# Patient Record
Sex: FEMALE | Race: WHITE | Employment: OTHER | URBAN - METROPOLITAN AREA
[De-identification: names, ages, dates, MRNs, and addresses within clinical notes are randomized per-mention and may not be internally consistent; named-entity substitution may affect disease eponyms.]

---

## 2022-10-14 ENCOUNTER — APPOINTMENT (OUTPATIENT)
Dept: GENERAL RADIOLOGY | Age: 72
End: 2022-10-14
Payer: OTHER MISCELLANEOUS

## 2022-10-14 ENCOUNTER — HOSPITAL ENCOUNTER (EMERGENCY)
Age: 72
Discharge: HOME | End: 2022-10-14
Attending: EMERGENCY MEDICINE
Payer: OTHER MISCELLANEOUS

## 2022-10-14 ENCOUNTER — APPOINTMENT (OUTPATIENT)
Dept: CT IMAGING | Age: 72
End: 2022-10-14
Payer: OTHER MISCELLANEOUS

## 2022-10-14 VITALS
OXYGEN SATURATION: 95 % | HEIGHT: 64 IN | TEMPERATURE: 99.3 F | HEART RATE: 83 BPM | BODY MASS INDEX: 38.41 KG/M2 | WEIGHT: 225 LBS | SYSTOLIC BLOOD PRESSURE: 142 MMHG | DIASTOLIC BLOOD PRESSURE: 68 MMHG | RESPIRATION RATE: 18 BRPM

## 2022-10-14 DIAGNOSIS — M25.561 ACUTE PAIN OF RIGHT KNEE: ICD-10-CM

## 2022-10-14 DIAGNOSIS — R07.89 CHEST WALL PAIN: ICD-10-CM
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
## 2022-10-14 DIAGNOSIS — S40.022A ARM CONTUSION, LEFT, INITIAL ENCOUNTER: ICD-10-CM

## 2022-10-14 LAB — TROPONIN, HIGH SENSITIVITY: 8 NG/L (ref 0–14)

## 2022-10-14 PROCEDURE — 99285 EMERGENCY DEPT VISIT HI MDM: CPT

## 2022-10-14 PROCEDURE — 84484 ASSAY OF TROPONIN QUANT: CPT

## 2022-10-14 PROCEDURE — 73562 X-RAY EXAM OF KNEE 3: CPT

## 2022-10-14 PROCEDURE — 73090 X-RAY EXAM OF FOREARM: CPT

## 2022-10-14 PROCEDURE — 73060 X-RAY EXAM OF HUMERUS: CPT

## 2022-10-14 PROCEDURE — 71250 CT THORAX DX C-: CPT

## 2022-10-14 PROCEDURE — 93005 ELECTROCARDIOGRAM TRACING: CPT | Performed by: EMERGENCY MEDICINE

## 2022-10-14 PROCEDURE — 36415 COLL VENOUS BLD VENIPUNCTURE: CPT

## 2022-10-14 ASSESSMENT — PAIN DESCRIPTION - ORIENTATION: ORIENTATION: LEFT

## 2022-10-14 ASSESSMENT — PAIN DESCRIPTION - LOCATION: LOCATION: LEG

## 2022-10-14 ASSESSMENT — PAIN - FUNCTIONAL ASSESSMENT: PAIN_FUNCTIONAL_ASSESSMENT: 0-10

## 2022-10-14 ASSESSMENT — LIFESTYLE VARIABLES
HOW OFTEN DO YOU HAVE A DRINK CONTAINING ALCOHOL: NEVER
HOW MANY STANDARD DRINKS CONTAINING ALCOHOL DO YOU HAVE ON A TYPICAL DAY: PATIENT DOES NOT DRINK

## 2022-10-14 ASSESSMENT — PAIN SCALES - GENERAL: PAINLEVEL_OUTOF10: 8
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
## 2022-10-15 LAB
EKG ATRIAL RATE: 69 BPM
EKG P AXIS: 88 DEGREES
EKG P-R INTERVAL: 142 MS
EKG Q-T INTERVAL: 412 MS
EKG QRS DURATION: 72 MS
EKG QTC CALCULATION (BAZETT): 441 MS
EKG R AXIS: -13 DEGREES
EKG T AXIS: 13 DEGREES
EKG VENTRICULAR RATE: 69 BPM
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
## 2022-10-15 NOTE — DISCHARGE INSTRUCTIONS
Take ibuprofen and acetaminophen as prescribed and on a regular schedule for the next few days. You can alternate these 2 medications every 3 hours or do them together every 6 hours to help control your pain. Otherwise, utilize rest, ice for 20 minutes several times a day, and elevate as much as possible to minimize the pain and swelling. Follow-up with your PCP for recheck. PLEASE RETURN TO THE EMERGENCY DEPARTMENT IMMEDIATELY if your symptoms worsen in anyway or in 1-2 days if not improved for re-evaluation. You should immediately return to the ER for symptoms such as new or worsening pain, fever, numbness or weakness to the arms or legs, coolness or color change of the arms or legs. [address]!!!    From [facility] ([facility]) and [address] [place] [address] Emergency Services    On behalf of the Emergency Department staff at [facility]), I would like to thank you for giving us the opportunity to address your health care needs and concerns. We hope that during your visit, our service was delivered in a professional and caring manner. Please keep [facility] ([facility]) in mind as we walk with you down the path to your own personal wellness. Please expect an automated text message or email from us so we can ask a few questions about your health and progress. Based on your answers, a clinician may call you back to offer help and instructions. Please understand that early in the process of an illness or injury, an emergency department workup can be falsely reassuring. If you notice any worsening, changing or persistent symptoms please call your family doctor or return to the ER immediately. Tell us how we did during your visit at [email or web address]   and let us know about your experience